# Patient Record
(demographics unavailable — no encounter records)

---

## 2024-10-17 NOTE — HISTORY OF PRESENT ILLNESS
[de-identified] : 10/17/24- Patient its here for WC FU  9/12/24: here for [facility]  8/15/2024: Pt here for f/u L knee and L shoulder s/p work related injury 6/1/2022. Pt did f/u w/ Dr. Leos and has been doing PT for the L shoulder, last visit 6/2024. He still c/o L knee pain, c/o pain with walking and jogging, states he cannot go to the gym.   11/9/23: Patient here for follow up of left knee and MRI results. Patient also c/o ongoing left shoulder pain, mainly located in the trapezial region, radiating to his neck and down his left arm. Patient states he currently can not move his neck due to pain  08/17/2023 TALISHA is presenting today for followup. Pain and symptoms are about the same re: left knee. He has been doing HEP. He denies any numbness/tingling/fevers/chills.  03/02/2023 TALISHA is presenting today for followup. Pain and symptoms are similar to the previous visit. He denies any numbness/tingling/fevers/chills. He underwent an MRI since last visit, and is here to discuss the imaging results.  09/29/2022: Patient is here today for a followup visit for LT knee pain and LT shoulder pain. LT knee pain improving, at last visit ordered MRI LT shoulder, pt did not go due to discomfort inside MRI machine.  The patient is a 57 year old R hand dominant M who presents today complaining of left shoulder pain. Shoulder pain/ limited ROM/ weakness since initial injury. Recovering well from L tibial plateau fx. Date of Injury/Onset: 6/1/22 Pain: At Rest: 8/10 With Activity: 9/10 Mechanism of injury: fell off of a ladder This is a Work Related Injury being treated under Worker's Compensation. This is not an athletic injury occurring associated with an interscholastic or organized sports team. Quality of symptoms: burning shooting stabbing Improves with: rest Worse with: any movement Treatment/Imaging/Studies Since Last Visit: none Reports Available For Review Today: none Additional Information: None [FreeTextEntry1] : Left knee, shoulder and neck pain  [FreeTextEntry3] : 6/1/22 [FreeTextEntry7] : neck  [de-identified] : None, had a CSI on left shoulder last visit that helped for approximately a week.

## 2024-10-17 NOTE — DISCUSSION/SUMMARY
[de-identified] : Patient has reached MMI  Instructions: Dx / Natural History The patient was advised of the diagnosis.  The natural history of the pathology was explained in full to the patient in layman's terms.  Several different treatment options were discussed and explained in full to the patient including the risks and benefits of both surgical and non-surgical treatments.  All questions and concerns were answered.   RICE I explained to the patient that rest, ice, compression, and elevation would benefit them.  They may return to activity after follow-up or when they no longer have any pain.   NSAIDs - OTC Patient is to begin over the counter oral anti-inflammatory medications on an as needed basis, as long as there are no medical contraindications.  Patient is counseled on possible GI and blood pressure side effects.   Pain Guide Activities The patient was advised to let pain guide the gradual advancement of activities.   Icing The patient was advised to apply ice (wrapped in a towel or protective covering) to the area daily (20 minutes at a time, 2-4X/day).   All of the patient's questions were answered to His satisfaction. Diagnoses and potential treatments were reviewed. He agreed with the plan and would like to move forward with it.

## 2024-10-17 NOTE — WORK
[FreeTextEntry7] : SHOULDER [FreeTextEntry8] : SEE EXAM [de-identified] : SEE EXAM [de-identified] : None [FreeTextEntry5] : 35 [de-identified] : Deficits in abduction, IR and ER compared to contralataral  [de-identified] : KNEE [de-identified] : SEE EXAM [de-identified] : SEE EXAM [de-identified] : none [de-identified] : 10 [de-identified] : loss of flexion

## 2024-10-17 NOTE — WORK
[FreeTextEntry7] : SHOULDER [FreeTextEntry8] : SEE EXAM [de-identified] : SEE EXAM [de-identified] : None [FreeTextEntry5] : 35 [de-identified] : Deficits in abduction, IR and ER compared to contralataral  [de-identified] : KNEE [de-identified] : SEE EXAM [de-identified] : SEE EXAM [de-identified] : none [de-identified] : 10 [de-identified] : loss of flexion

## 2024-10-17 NOTE — DISCUSSION/SUMMARY
[de-identified] : Patient has reached MMI  Instructions: Dx / Natural History The patient was advised of the diagnosis.  The natural history of the pathology was explained in full to the patient in layman's terms.  Several different treatment options were discussed and explained in full to the patient including the risks and benefits of both surgical and non-surgical treatments.  All questions and concerns were answered.   RICE I explained to the patient that rest, ice, compression, and elevation would benefit them.  They may return to activity after follow-up or when they no longer have any pain.   NSAIDs - OTC Patient is to begin over the counter oral anti-inflammatory medications on an as needed basis, as long as there are no medical contraindications.  Patient is counseled on possible GI and blood pressure side effects.   Pain Guide Activities The patient was advised to let pain guide the gradual advancement of activities.   Icing The patient was advised to apply ice (wrapped in a towel or protective covering) to the area daily (20 minutes at a time, 2-4X/day).   All of the patient's questions were answered to His satisfaction. Diagnoses and potential treatments were reviewed. He agreed with the plan and would like to move forward with it.

## 2024-10-17 NOTE — PHYSICAL EXAM
[de-identified] : active abduction 100 degrees [TWNoteComboBox6] : internal rotation L4 [de-identified] : external rotation 50 degrees [] : no calf tenderness [TWNoteComboBox7] : flexion 120 degrees

## 2024-10-17 NOTE — HISTORY OF PRESENT ILLNESS
[de-identified] : 10/17/24- Patient its here for WC FU  9/12/24: here for [facility]  8/15/2024: Pt here for f/u L knee and L shoulder s/p work related injury 6/1/2022. Pt did f/u w/ Dr. Leos and has been doing PT for the L shoulder, last visit 6/2024. He still c/o L knee pain, c/o pain with walking and jogging, states he cannot go to the gym.   11/9/23: Patient here for follow up of left knee and MRI results. Patient also c/o ongoing left shoulder pain, mainly located in the trapezial region, radiating to his neck and down his left arm. Patient states he currently can not move his neck due to pain  08/17/2023 TALISHA is presenting today for followup. Pain and symptoms are about the same re: left knee. He has been doing HEP. He denies any numbness/tingling/fevers/chills.  03/02/2023 TALISHA is presenting today for followup. Pain and symptoms are similar to the previous visit. He denies any numbness/tingling/fevers/chills. He underwent an MRI since last visit, and is here to discuss the imaging results.  09/29/2022: Patient is here today for a followup visit for LT knee pain and LT shoulder pain. LT knee pain improving, at last visit ordered MRI LT shoulder, pt did not go due to discomfort inside MRI machine.  The patient is a 57 year old R hand dominant M who presents today complaining of left shoulder pain. Shoulder pain/ limited ROM/ weakness since initial injury. Recovering well from L tibial plateau fx. Date of Injury/Onset: 6/1/22 Pain: At Rest: 8/10 With Activity: 9/10 Mechanism of injury: fell off of a ladder This is a Work Related Injury being treated under Worker's Compensation. This is not an athletic injury occurring associated with an interscholastic or organized sports team. Quality of symptoms: burning shooting stabbing Improves with: rest Worse with: any movement Treatment/Imaging/Studies Since Last Visit: none Reports Available For Review Today: none Additional Information: None [FreeTextEntry1] : Left knee, shoulder and neck pain  [FreeTextEntry3] : 6/1/22 [FreeTextEntry7] : neck  [de-identified] : None, had a CSI on left shoulder last visit that helped for approximately a week.

## 2025-05-12 NOTE — IMAGING
[de-identified] : RIGHT KNEE Inspection: no effusion Palpation: no tenderness Knee Range of Motion: 0-140  Strength: 5/5 Quadriceps strength, 5/5 Hamstring strength Neurological: light touch is intact throughout Ligament Stability and Special Tests:  Able to SLR McMurrays: neg Lachman: neg Pivot Shift: neg Posterior Drawer: neg Valgus: neg Varus: neg Patella Apprehension: neg Patella Maltracking: neg Gait: non-antalgic

## 2025-05-12 NOTE — HISTORY OF PRESENT ILLNESS
[de-identified] : 5/8/25: patient presents for follow up of the Right knee. reports onset of swelling for last 6 weeks. Got worse two weeks ago during vacation in [place]. Today knee is not as swollen, and c/o pain with prolonged walking.  10/17/2024: The patient is a 60 year old M, RHD who presents today complaining of right knee.  Date of Injury/Onset: Beginning of October 2024. Pain:    At Rest: 3/10 With Activity:  7/10 Mechanism of injury: Sudden Quality of symptoms: Swelling  Improves with: N/A Worse with: None Prior treatment/ Imaging: None Previous injury: None  Additional Information: [None]

## 2025-05-12 NOTE — DISCUSSION/SUMMARY
[de-identified] : Assessment:   The patient is a 60 year old male with physical exam findings consistent with RIGHT KNEE OSTEOARTHRITIS     - We discussed their diagnosis and treatment options at length including the risks and benefits of both surgical and nonsurgical options. - We will continue conservative treatment with activity modification, PT, icing, weight loss, and anti-inflammatory medications. - The patient was provided with a PT prescription to work on ROM, hip ER/abductors strengthening, quad/hamstring stretches and strengthening, and other exercises. - The patient was advised to let pain guide the gradual advancement of activities. - We discussed the possible of injections in the future.   Follow up as needed in 6 weeks as to re-evaluate

## 2025-05-12 NOTE — DISCUSSION/SUMMARY
[de-identified] : Assessment:   The patient is a 60 year old male with physical exam findings consistent with RIGHT KNEE OSTEOARTHRITIS     - We discussed their diagnosis and treatment options at length including the risks and benefits of both surgical and nonsurgical options. - We will continue conservative treatment with activity modification, PT, icing, weight loss, and anti-inflammatory medications. - The patient was provided with a PT prescription to work on ROM, hip ER/abductors strengthening, quad/hamstring stretches and strengthening, and other exercises. - The patient was advised to let pain guide the gradual advancement of activities. - We discussed the possible of injections in the future.   Follow up as needed in 6 weeks as to re-evaluate

## 2025-05-12 NOTE — IMAGING
[de-identified] : RIGHT KNEE Inspection: no effusion Palpation: no tenderness Knee Range of Motion: 0-140  Strength: 5/5 Quadriceps strength, 5/5 Hamstring strength Neurological: light touch is intact throughout Ligament Stability and Special Tests:  Able to SLR McMurrays: neg Lachman: neg Pivot Shift: neg Posterior Drawer: neg Valgus: neg Varus: neg Patella Apprehension: neg Patella Maltracking: neg Gait: non-antalgic

## 2025-05-12 NOTE — HISTORY OF PRESENT ILLNESS
[de-identified] : 5/8/25: patient presents for follow up of the Right knee. reports onset of swelling for last 6 weeks. Got worse two weeks ago during vacation in [place]. Today knee is not as swollen, and c/o pain with prolonged walking.  10/17/2024: The patient is a 60 year old M, RHD who presents today complaining of right knee.  Date of Injury/Onset: Beginning of October 2024. Pain:    At Rest: 3/10 With Activity:  7/10 Mechanism of injury: Sudden Quality of symptoms: Swelling  Improves with: N/A Worse with: None Prior treatment/ Imaging: None Previous injury: None  Additional Information: [None]